# Patient Record
Sex: FEMALE | Race: WHITE | NOT HISPANIC OR LATINO
[De-identification: names, ages, dates, MRNs, and addresses within clinical notes are randomized per-mention and may not be internally consistent; named-entity substitution may affect disease eponyms.]

---

## 2022-12-20 ENCOUNTER — NON-APPOINTMENT (OUTPATIENT)
Age: 52
End: 2022-12-20

## 2022-12-22 ENCOUNTER — APPOINTMENT (OUTPATIENT)
Dept: ORTHOPEDIC SURGERY | Facility: CLINIC | Age: 52
End: 2022-12-22
Payer: COMMERCIAL

## 2022-12-22 VITALS — BODY MASS INDEX: 28.12 KG/M2 | WEIGHT: 175 LBS | HEIGHT: 66 IN

## 2022-12-22 DIAGNOSIS — Z00.00 ENCOUNTER FOR GENERAL ADULT MEDICAL EXAMINATION W/OUT ABNORMAL FINDINGS: ICD-10-CM

## 2022-12-22 PROCEDURE — 20612 ASPIRATE/INJ GANGLION CYST: CPT

## 2022-12-22 PROCEDURE — 73130 X-RAY EXAM OF HAND: CPT | Mod: RT

## 2022-12-22 PROCEDURE — 99204 OFFICE O/P NEW MOD 45 MIN: CPT | Mod: 25

## 2022-12-22 RX ORDER — LORATADINE 5 MG/5 ML
5 SOLUTION, ORAL ORAL
Refills: 0 | Status: ACTIVE | COMMUNITY

## 2022-12-22 NOTE — IMAGING
[de-identified] : right hand:\par dorsal, mobile mass\par no ttp\par farom\par nvid [Right] : right hand [There are no fractures, subluxations or dislocations. No significant abnormalities are seen] : There are no fractures, subluxations or dislocations. No significant abnormalities are seen

## 2023-04-24 ENCOUNTER — APPOINTMENT (OUTPATIENT)
Dept: ORTHOPEDIC SURGERY | Facility: CLINIC | Age: 53
End: 2023-04-24
Payer: COMMERCIAL

## 2023-04-24 VITALS — BODY MASS INDEX: 28.12 KG/M2 | WEIGHT: 175 LBS | HEIGHT: 66 IN

## 2023-04-24 DIAGNOSIS — M67.431 GANGLION, RIGHT WRIST: ICD-10-CM

## 2023-04-24 DIAGNOSIS — R22.31 LOCALIZED SWELLING, MASS AND LUMP, RIGHT UPPER LIMB: ICD-10-CM

## 2023-04-24 PROCEDURE — 99214 OFFICE O/P EST MOD 30 MIN: CPT | Mod: 25

## 2023-04-24 PROCEDURE — 20605 DRAIN/INJ JOINT/BURSA W/O US: CPT

## 2023-04-24 NOTE — ASSESSMENT
[FreeTextEntry1] : \par \par We reviewed the pathology and treatment options- patient aware that options include observation, aspiration, surgery- aspiration with 50/50 chance of resolution, surgery usually 95-97% effective. Today the patient is amenable to aspiration,  Understands that with volar masses chance of arterial injury which may result in ecchymosis and occasional hematoma.  After sterile prep, aspirated jelly like substance consistent with ganglion\par \par Pt will rto in 6 weeks for f/u care. \par

## 2023-04-24 NOTE — IMAGING
[There are no fractures, subluxations or dislocations. No significant abnormalities are seen] : There are no fractures, subluxations or dislocations. No significant abnormalities are seen [de-identified] : right hand:\par dorsal, mobile mass\par no ttp\par farom\par nvid

## 2023-04-24 NOTE — HISTORY OF PRESENT ILLNESS
[de-identified] : 4/24/23:  pt here with recurrent right dorsal ganglion cyst. Pt requesting repeat aspiration. \par \par 12/22/22:  Pt has a mass on her right hand dorsally. [] : Post Surgical Visit: no [FreeTextEntry1] : right hand  [de-identified] : none